# Patient Record
Sex: MALE | Race: BLACK OR AFRICAN AMERICAN | NOT HISPANIC OR LATINO | Employment: UNEMPLOYED | URBAN - METROPOLITAN AREA
[De-identification: names, ages, dates, MRNs, and addresses within clinical notes are randomized per-mention and may not be internally consistent; named-entity substitution may affect disease eponyms.]

---

## 2020-05-06 ENCOUNTER — HOSPITAL ENCOUNTER (EMERGENCY)
Facility: HOSPITAL | Age: 35
Discharge: HOME | End: 2020-05-06
Attending: EMERGENCY MEDICINE
Payer: MEDICAID

## 2020-05-06 VITALS
HEART RATE: 88 BPM | SYSTOLIC BLOOD PRESSURE: 142 MMHG | WEIGHT: 215 LBS | BODY MASS INDEX: 31.84 KG/M2 | DIASTOLIC BLOOD PRESSURE: 76 MMHG | OXYGEN SATURATION: 97 % | RESPIRATION RATE: 18 BRPM | TEMPERATURE: 98 F | HEIGHT: 69 IN

## 2020-05-06 DIAGNOSIS — R06.02 SOB (SHORTNESS OF BREATH): ICD-10-CM

## 2020-05-06 DIAGNOSIS — B34.9 VIRAL SYNDROME: Primary | ICD-10-CM

## 2020-05-06 LAB
BILIRUBIN, POC UA: NEGATIVE
BLOOD, POC UA: NEGATIVE
CLARITY, POC UA: CLEAR
COLOR, POC UA: YELLOW
GLUCOSE, POC UA: NEGATIVE
KETONES, POC UA: NEGATIVE
LEUKOCYTE EST, POC UA: NEGATIVE
NITRITE, POC UA: NEGATIVE
PH UR STRIP: 7 [PH]
PROTEIN, POC UA: NEGATIVE
SPECIFIC GRAVITY, POC UA: 1.02
UROBILINOGEN, POC UA: 0.2 E.U./DL

## 2020-05-06 PROCEDURE — 81003 URINALYSIS AUTO W/O SCOPE: CPT | Mod: ER

## 2020-05-06 PROCEDURE — 99284 EMERGENCY DEPT VISIT MOD MDM: CPT | Mod: 25,ER

## 2020-05-06 PROCEDURE — U0002 COVID-19 LAB TEST NON-CDC: HCPCS

## 2020-05-06 RX ORDER — ALBUTEROL SULFATE 90 UG/1
2 AEROSOL, METERED RESPIRATORY (INHALATION) EVERY 6 HOURS PRN
Qty: 18 G | Refills: 0 | Status: SHIPPED | OUTPATIENT
Start: 2020-05-06 | End: 2020-09-20 | Stop reason: SDUPTHER

## 2020-05-06 RX ORDER — MONTELUKAST SODIUM 10 MG/1
10 TABLET ORAL NIGHTLY
Qty: 30 TABLET | Refills: 0 | Status: SHIPPED | OUTPATIENT
Start: 2020-05-06 | End: 2020-06-05

## 2020-05-06 RX ORDER — BENZONATATE 100 MG/1
100 CAPSULE ORAL 3 TIMES DAILY PRN
Qty: 20 CAPSULE | Refills: 0 | Status: SHIPPED | OUTPATIENT
Start: 2020-05-06 | End: 2020-05-16

## 2020-05-06 RX ORDER — ALBUTEROL SULFATE 90 UG/1
2 AEROSOL, METERED RESPIRATORY (INHALATION) EVERY 6 HOURS PRN
COMMUNITY
End: 2020-05-06

## 2020-05-07 ENCOUNTER — TELEPHONE (OUTPATIENT)
Dept: EMERGENCY MEDICINE | Facility: HOSPITAL | Age: 35
End: 2020-05-07

## 2020-05-07 LAB — SARS-COV-2 RDRP RESP QL NAA+PROBE: NEGATIVE

## 2020-05-07 NOTE — ED PROVIDER NOTES
"Encounter Date: 5/6/2020    SCRIBE #1 NOTE: I, Macy Casey, am scribing for, and in the presence of,  Dr. Ashley. I have scribed the following portions of the note - Other sections scribed: HPI, ROS, PE.       History     Chief Complaint   Patient presents with    Shortness of Breath     pt c/o SOB and "feeling dehydrated" since yesterday. pt reports his mom tested positive for COVID 3 weeks ago. denies difficulty breathing, fever, chills or body aches. NAD noted at this time     Seb Hein is a 34 y.o. asthmatic male who presents to the ED complaining of intermittent shortness of breath, increased thirst, productive cough with clear production, a dry itchy throat and subjective fever starting 4 days ago. Pt denies feeling as though his asthma is flaring up but he reports using his albuterol inhaler 5 times daily to "open up" his lungs due to the treat of COVID-19.  Today, he used the inhaler per his usual routine but states he still feels SOB. He report exposure to COVID positive mother 3 weeks ago but he tested negative at that time. Pt denied chest pain, nausea, vomiting, difficulty urinating, dysuria, frequency, hematuria and urgency.     The history is provided by the patient. No  was used.     Review of patient's allergies indicates:  No Known Allergies  Past Medical History:   Diagnosis Date    Asthma      History reviewed. No pertinent surgical history.  History reviewed. No pertinent family history.  Social History     Tobacco Use    Smoking status: Never Smoker    Smokeless tobacco: Never Used   Substance Use Topics    Alcohol use: Never     Frequency: Never    Drug use: Never     Review of Systems   Constitutional: Positive for fever (subjective).   HENT: Positive for sore throat.    Respiratory: Positive for cough and shortness of breath.    Cardiovascular: Negative for chest pain.   Gastrointestinal: Negative for nausea and vomiting.   Endocrine: Positive for " polydipsia.   Genitourinary: Negative for difficulty urinating, dysuria, frequency, hematuria and urgency.   All other systems reviewed and are negative.      Physical Exam     Initial Vitals [05/06/20 2105]   BP Pulse Resp Temp SpO2   (!) 142/79 84 18 98.5 °F (36.9 °C) 100 %      MAP       --         Physical Exam    Nursing note and vitals reviewed.  Constitutional: He appears well-developed and well-nourished.   HENT:   Head: Normocephalic and atraumatic.   Right Ear: External ear normal.   Left Ear: External ear normal.   Nose: Nose normal.   Eyes: Conjunctivae are normal.   Neck: Normal range of motion and phonation normal. Neck supple. No stridor present.   Cardiovascular: Normal rate and intact distal pulses.   Pulmonary/Chest: Effort normal and breath sounds normal. No accessory muscle usage or stridor. No tachypnea. No respiratory distress. He has no decreased breath sounds. He has no wheezes. He has no rhonchi. He has no rales.   Abdominal: Normal appearance.   Musculoskeletal: Normal range of motion. He exhibits no edema or tenderness.   Neurological: He is alert and oriented to person, place, and time. Gait normal.   Skin: Skin is warm and dry.   Psychiatric: He has a normal mood and affect. His behavior is normal.         ED Course   Procedures  Labs Reviewed   SARS-COV-2 RNA AMPLIFICATION, QUAL    Narrative:     What symptom criteria does the patient meet?->Cough  What symptom criteria does the patient meet?->Shortness of  breath or difficulty breathing   POCT URINALYSIS W/O SCOPE   POCT URINALYSIS W/O SCOPE          Imaging Results          X-Ray Chest AP Portable (Final result)  Result time 05/06/20 21:55:30    Final result by Kd Cee MD (05/06/20 21:55:30)                 Impression:      No acute cardiopulmonary process identified.      Electronically signed by: Kd Cee MD  Date:    05/06/2020  Time:    21:55             Narrative:    EXAMINATION:  XR CHEST AP PORTABLE    CLINICAL  HISTORY:  Shortness of breath    TECHNIQUE:  Single frontal view of the chest was performed.    COMPARISON:  None    FINDINGS:  Cardiac silhouette is normal in size.  Lungs are symmetrically expanded.  No evidence of focal consolidative process, pneumothorax, or significant effusion.  No acute osseous abnormality identified.                                 Medical Decision Making:   History:   Old Medical Records: I decided to obtain old medical records.  Clinical Tests:   Lab Tests: Ordered and Reviewed  Radiological Study: Ordered and Reviewed    Labs Reviewed  Admission on 05/06/2020, Discharged on 05/06/2020   Component Date Value Ref Range Status    SARS-CoV-2 RNA, Amplification, Qual 05/06/2020 Negative  Negative Final    Comment: This test utilizes isothermal nucleic acid amplification   technology to detect the SARS-CoV-2 RdRp nucleic acid segment.   The analytical sensitivity (limit of detection) is 125 genome   equivalents/mL.   A POSITIVE result implies infection with the SARS-CoV-2 virus;  the patient is presumed to be contagious.    A NEGATIVE result means that SARS-CoV-2 nucleic acids are not  present above the limit of detection. It does not rule out the   possibility of COVID-19 and should not be the sole basis for   treatment decisions. If COVID-19 is strongly suspected based on  clinical and exposure history, re-testing should be considered.   This test is only for use under the Food and Drug   Administration s Emergency Use Authorization (EUA).   Commercial kits are provided by [employer].   Performance characteristics of the EUA have been independently  verified by [facility] Department of  Pathology and Laboratory Medicine.   ____________________________________________________                           _____________  The ID NOW COVID-19 Letter of Authorization, along with the   authorized Fact Sheet for Healthcare Providers, the authorized Fact  Sheet for Patients, and  authorized labeling are available on the FDA   website:  www.fda.gov/MedicalDevices/Safety/EmergencySituations/chl358073.htm      Glucose, UA 05/06/2020 Negative   Final    Bilirubin, UA 05/06/2020 Negative   Final    Ketones, UA 05/06/2020 Negative   Final    Spec Grav UA 05/06/2020 1.020   Final    Blood, UA 05/06/2020 Negative   Final    PH, UA 05/06/2020 7.0   Final    Protein, UA 05/06/2020 Negative   Final    Urobilinogen, UA 05/06/2020 0.2  E.U./dL Final    Nitrite, UA 05/06/2020 Negative   Final    Leukocytes, UA 05/06/2020 Negative   Final    Color, UA 05/06/2020 Yellow   Final    Clarity, UA 05/06/2020 Clear   Final        Imaging Reviewed    Imaging Results          X-Ray Chest AP Portable (Final result)  Result time 05/06/20 21:55:30    Final result by Kd Cee MD (05/06/20 21:55:30)                 Impression:      No acute cardiopulmonary process identified.      Electronically signed by: Kd Cee MD  Date:    05/06/2020  Time:    21:55             Narrative:    EXAMINATION:  XR CHEST AP PORTABLE    CLINICAL HISTORY:  Shortness of breath    TECHNIQUE:  Single frontal view of the chest was performed.    COMPARISON:  None    FINDINGS:  Cardiac silhouette is normal in size.  Lungs are symmetrically expanded.  No evidence of focal consolidative process, pneumothorax, or significant effusion.  No acute osseous abnormality identified.                                Medications given in ED    Medications - No data to display    Scribe Attestation:   Scribe #1: I performed the above scribed service and the documentation accurately describes the services I performed. I attest to the accuracy of the note.    This document was produced by a scribe under my direction and in my presence. I agree with the content of the note and have made any necessary edits.     Charla Ashley MD         Note was created using voice recognition software. Note may have occasional typographical errors that  may not have been identified and edited despite good ruth initial review prior to signing.                                 Clinical Impression:     1. Viral syndrome    2. SOB (shortness of breath)                ED Disposition Condition    Discharge Stable        ED Prescriptions     Medication Sig Dispense Start Date End Date Auth. Provider    benzonatate (TESSALON) 100 MG capsule Take 1 capsule (100 mg total) by mouth 3 (three) times daily as needed for Cough. 20 capsule 5/6/2020 5/16/2020 Charla Ashley MD    montelukast (SINGULAIR) 10 mg tablet Take 1 tablet (10 mg total) by mouth every evening. 30 tablet 5/6/2020 6/5/2020 Charla Ashley MD    albuterol (PROVENTIL/VENTOLIN HFA) 90 mcg/actuation inhaler Inhale 2 puffs into the lungs every 6 (six) hours as needed for Wheezing or Shortness of Breath. 18 g 5/6/2020  Charla Ashley MD        Follow-up Information     Follow up With Specialties Details Why Contact Info    Your PCP  Call in 1 day to schedule an appointment, for re-evaluation of today's complaint, and ongoing care     [facility] Emergency Medicine Go to  As needed, If symptoms worsen [address] [facility] [address] [phone]                                     Charla Ashley MD  05/07/20 0225

## 2020-05-07 NOTE — ED TRIAGE NOTES
"Pt c/o SOB, fatigue and feeling "hot" x1 day. Pt reports using Albuterol MDI without relief. BBS clear. Pt denies any fever, cough, chest pain or dizziness.  "

## 2020-05-07 NOTE — DISCHARGE INSTRUCTIONS
Drink plenty of fluids. Limit/avoid caffeine and alcohol intake.  May take tylenol or Ibuprofen as directed on package as needed for fever.

## 2020-09-20 ENCOUNTER — HOSPITAL ENCOUNTER (EMERGENCY)
Facility: OTHER | Age: 35
Discharge: HOME | End: 2020-09-20
Attending: EMERGENCY MEDICINE
Payer: MEDICAID

## 2020-09-20 VITALS
SYSTOLIC BLOOD PRESSURE: 138 MMHG | WEIGHT: 220 LBS | RESPIRATION RATE: 18 BRPM | HEIGHT: 69 IN | OXYGEN SATURATION: 98 % | TEMPERATURE: 100 F | BODY MASS INDEX: 32.58 KG/M2 | HEART RATE: 96 BPM | DIASTOLIC BLOOD PRESSURE: 78 MMHG

## 2020-09-20 DIAGNOSIS — R07.89 CHEST WALL PAIN: ICD-10-CM

## 2020-09-20 DIAGNOSIS — R50.9 FEVER, UNSPECIFIED FEVER CAUSE: ICD-10-CM

## 2020-09-20 DIAGNOSIS — R10.9 RIGHT FLANK PAIN: Primary | ICD-10-CM

## 2020-09-20 DIAGNOSIS — S29.012A MUSCLE STRAIN OF RIGHT UPPER BACK, INITIAL ENCOUNTER: ICD-10-CM

## 2020-09-20 LAB
BILIRUB UR QL STRIP: NEGATIVE
CLARITY UR: CLEAR
COLOR UR: YELLOW
CTP QC/QA: YES
GLUCOSE UR QL STRIP: NEGATIVE
HGB UR QL STRIP: NEGATIVE
KETONES UR QL STRIP: NEGATIVE
LEUKOCYTE ESTERASE UR QL STRIP: NEGATIVE
NITRITE UR QL STRIP: NEGATIVE
PH UR STRIP: 6 [PH] (ref 5–8)
PROT UR QL STRIP: NEGATIVE
SARS-COV-2 RDRP RESP QL NAA+PROBE: NEGATIVE
SP GR UR STRIP: 1.02 (ref 1–1.03)
URN SPEC COLLECT METH UR: NORMAL
UROBILINOGEN UR STRIP-ACNC: 1 EU/DL

## 2020-09-20 PROCEDURE — 25000003 PHARM REV CODE 250: Performed by: EMERGENCY MEDICINE

## 2020-09-20 PROCEDURE — 81003 URINALYSIS AUTO W/O SCOPE: CPT

## 2020-09-20 PROCEDURE — 99284 EMERGENCY DEPT VISIT MOD MDM: CPT | Mod: 25

## 2020-09-20 PROCEDURE — U0002 COVID-19 LAB TEST NON-CDC: HCPCS | Performed by: EMERGENCY MEDICINE

## 2020-09-20 RX ORDER — CYCLOBENZAPRINE HCL 10 MG
10 TABLET ORAL 3 TIMES DAILY PRN
Qty: 30 TABLET | Refills: 0 | Status: SHIPPED | OUTPATIENT
Start: 2020-09-20 | End: 2020-09-30

## 2020-09-20 RX ORDER — ALBUTEROL SULFATE 90 UG/1
2 AEROSOL, METERED RESPIRATORY (INHALATION) EVERY 6 HOURS PRN
Qty: 18 G | Refills: 0 | Status: SHIPPED | OUTPATIENT
Start: 2020-09-20

## 2020-09-20 RX ORDER — KETOROLAC TROMETHAMINE 10 MG/1
10 TABLET, FILM COATED ORAL
Status: COMPLETED | OUTPATIENT
Start: 2020-09-20 | End: 2020-09-20

## 2020-09-20 RX ORDER — ETODOLAC 400 MG/1
400 TABLET, FILM COATED ORAL 2 TIMES DAILY PRN
Qty: 20 TABLET | Refills: 0 | OUTPATIENT
Start: 2020-09-20 | End: 2023-02-17

## 2020-09-20 RX ADMIN — KETOROLAC TROMETHAMINE 10 MG: 10 TABLET, FILM COATED ORAL at 06:09

## 2020-09-20 NOTE — Clinical Note
"Seb Keecasimiro Hein was seen and treated in our emergency department on 9/20/2020.  He may return to work on 09/22/2020.  No lifting greater than 10 lb, strenuous activity, going up and down stairs or working on ladders until 9/25/2020     If you have any questions or concerns, please don't hesitate to call.      Hollis Gooden MD"
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
## 2020-09-20 NOTE — ED NOTES
Pt presents to the ED w/ c/o right sided flank pain x 1 day.  Reports that he has problems taking a deep breath due to the pain.  States that the right sided flank pain radiates from his flank to right side of neck.  States that pain is worse lying down.  Denies injury/trauma, abdominal pain, nausea, vomiting, chest pain, urinary symptoms.  Took OTC tylenol for pain without relief.

## 2023-02-17 ENCOUNTER — HOSPITAL ENCOUNTER (EMERGENCY)
Facility: OTHER | Age: 38
Discharge: HOME | End: 2023-02-17
Attending: EMERGENCY MEDICINE
Payer: MEDICAID

## 2023-02-17 VITALS
BODY MASS INDEX: 31.1 KG/M2 | SYSTOLIC BLOOD PRESSURE: 130 MMHG | DIASTOLIC BLOOD PRESSURE: 84 MMHG | RESPIRATION RATE: 17 BRPM | OXYGEN SATURATION: 100 % | WEIGHT: 210 LBS | TEMPERATURE: 98 F | HEIGHT: 69 IN | HEART RATE: 72 BPM

## 2023-02-17 DIAGNOSIS — S02.5XXB OPEN FRACTURE OF TOOTH, INITIAL ENCOUNTER: Primary | ICD-10-CM

## 2023-02-17 DIAGNOSIS — R07.81 RIB PAIN ON RIGHT SIDE: ICD-10-CM

## 2023-02-17 LAB
BACTERIA #/AREA URNS HPF: NORMAL /HPF
BILIRUB UR QL STRIP: NEGATIVE
CLARITY UR: CLEAR
COLOR UR: YELLOW
GLUCOSE UR QL STRIP: NEGATIVE
HGB UR QL STRIP: NEGATIVE
HYALINE CASTS #/AREA URNS LPF: 0 /LPF
KETONES UR QL STRIP: NEGATIVE
LEUKOCYTE ESTERASE UR QL STRIP: NEGATIVE
MICROSCOPIC COMMENT: NORMAL
NITRITE UR QL STRIP: NEGATIVE
PH UR STRIP: 6 [PH] (ref 5–8)
PROT UR QL STRIP: ABNORMAL
RBC #/AREA URNS HPF: 1 /HPF (ref 0–4)
SP GR UR STRIP: 1.02 (ref 1–1.03)
SQUAMOUS #/AREA URNS HPF: 0 /HPF
URN SPEC COLLECT METH UR: ABNORMAL
UROBILINOGEN UR STRIP-ACNC: ABNORMAL EU/DL
WBC #/AREA URNS HPF: 2 /HPF (ref 0–5)

## 2023-02-17 PROCEDURE — 99284 EMERGENCY DEPT VISIT MOD MDM: CPT

## 2023-02-17 PROCEDURE — 25000003 PHARM REV CODE 250: Performed by: EMERGENCY MEDICINE

## 2023-02-17 PROCEDURE — 81000 URINALYSIS NONAUTO W/SCOPE: CPT | Performed by: EMERGENCY MEDICINE

## 2023-02-17 RX ORDER — METHOCARBAMOL 500 MG/1
1000 TABLET, FILM COATED ORAL 3 TIMES DAILY PRN
Qty: 30 TABLET | Refills: 0 | Status: SHIPPED | OUTPATIENT
Start: 2023-02-17 | End: 2023-02-22

## 2023-02-17 RX ORDER — IBUPROFEN 600 MG/1
600 TABLET ORAL EVERY 6 HOURS PRN
Qty: 20 TABLET | Refills: 0 | Status: SHIPPED | OUTPATIENT
Start: 2023-02-17

## 2023-02-17 RX ORDER — METHOCARBAMOL 500 MG/1
1000 TABLET, FILM COATED ORAL
Status: COMPLETED | OUTPATIENT
Start: 2023-02-17 | End: 2023-02-17

## 2023-02-17 RX ORDER — IBUPROFEN 600 MG/1
600 TABLET ORAL
Status: COMPLETED | OUTPATIENT
Start: 2023-02-17 | End: 2023-02-17

## 2023-02-17 RX ADMIN — IBUPROFEN 600 MG: 600 TABLET, FILM COATED ORAL at 11:02

## 2023-02-17 RX ADMIN — METHOCARBAMOL 1000 MG: 500 TABLET ORAL at 11:02

## 2023-02-17 NOTE — DISCHARGE INSTRUCTIONS
Eat only a soft diet until cleared by your dentist.  Use heat or ice and gentle massage to area of back pain.  Take prescribed medications as needed.
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
